# Patient Record
Sex: FEMALE | NOT HISPANIC OR LATINO | URBAN - METROPOLITAN AREA
[De-identification: names, ages, dates, MRNs, and addresses within clinical notes are randomized per-mention and may not be internally consistent; named-entity substitution may affect disease eponyms.]

---

## 2020-02-25 ENCOUNTER — OFFICE VISIT (OUTPATIENT)
Dept: FAMILY MEDICINE CLINIC | Facility: CLINIC | Age: 20
End: 2020-02-25
Payer: COMMERCIAL

## 2020-02-25 VITALS
WEIGHT: 121 LBS | OXYGEN SATURATION: 98 % | BODY MASS INDEX: 22.26 KG/M2 | RESPIRATION RATE: 17 BRPM | HEART RATE: 116 BPM | TEMPERATURE: 99.9 F | HEIGHT: 62 IN

## 2020-02-25 DIAGNOSIS — Z76.89 ENCOUNTER TO ESTABLISH CARE: Primary | ICD-10-CM

## 2020-02-25 DIAGNOSIS — J11.1 INFLUENZA: ICD-10-CM

## 2020-02-25 DIAGNOSIS — F41.9 ANXIETY: ICD-10-CM

## 2020-02-25 DIAGNOSIS — F32.81 PREMENSTRUAL DYSPHORIC DISORDER: ICD-10-CM

## 2020-02-25 DIAGNOSIS — R68.89 FLU-LIKE SYMPTOMS: ICD-10-CM

## 2020-02-25 DIAGNOSIS — G43.829 MENSTRUAL MIGRAINE WITHOUT STATUS MIGRAINOSUS, NOT INTRACTABLE: ICD-10-CM

## 2020-02-25 DIAGNOSIS — E03.9 ACQUIRED HYPOTHYROIDISM: ICD-10-CM

## 2020-02-25 LAB — S PYO AG THROAT QL: NEGATIVE

## 2020-02-25 PROCEDURE — 3008F BODY MASS INDEX DOCD: CPT | Performed by: INTERNAL MEDICINE

## 2020-02-25 PROCEDURE — 87880 STREP A ASSAY W/OPTIC: CPT | Performed by: INTERNAL MEDICINE

## 2020-02-25 PROCEDURE — 1036F TOBACCO NON-USER: CPT | Performed by: INTERNAL MEDICINE

## 2020-02-25 PROCEDURE — 87631 RESP VIRUS 3-5 TARGETS: CPT | Performed by: INTERNAL MEDICINE

## 2020-02-25 PROCEDURE — 99203 OFFICE O/P NEW LOW 30 MIN: CPT | Performed by: INTERNAL MEDICINE

## 2020-02-25 RX ORDER — OSELTAMIVIR PHOSPHATE 75 MG/1
75 CAPSULE ORAL EVERY 12 HOURS SCHEDULED
Qty: 14 CAPSULE | Refills: 0 | Status: SHIPPED | OUTPATIENT
Start: 2020-02-25 | End: 2020-03-03

## 2020-02-25 RX ORDER — THYROID,PORK 15 MG
TABLET ORAL
COMMUNITY
Start: 2020-01-29

## 2020-02-25 RX ORDER — NORTRIPTYLINE HYDROCHLORIDE 25 MG/1
25 CAPSULE ORAL DAILY
COMMUNITY
Start: 2020-01-21

## 2020-02-25 RX ORDER — ESCITALOPRAM OXALATE 20 MG/1
20 TABLET ORAL DAILY
COMMUNITY

## 2020-02-25 NOTE — PROGRESS NOTES
Assessment/Plan: This is most likely influenza A or B  Patient will take Tamiflu 75 b i d  For 7 days  She will continue with Tylenol 650 mg or ibuprofen 400 mg  every 6 hours as needed  In addition throat lozenges and cough medicine with dextromethorphan as needed  She will call tomorrow to check on the results of her influenza PCR to see if she needs to continue the Tamiflu  She was given a prescription for her mother for prophylaxis with Tamiflu  Patient is mildly hypothyroid and should she require monitoring for this she will return to our office after having a level done for her TSH and free T4 and free T3 since she is on [place] Thyroid  Otherwise she will see her gynecologist concerning her Lexapro and nortriptyline  Diet reviewed  Lifestyle modifications reviewed  Medications reviewed and ordered  Laboratory tests and studies reviewed and ordered  All patient's questions answered to patient satisfaction  Diagnoses and all orders for this visit:    Encounter to establish care  -     TSH, 3rd generation; Future  -     T4, free; Future  -     T3, free; Future  -     Anti-microsomal antibody; Future  -     CBC and differential; Future  -     Comprehensive metabolic panel; Future    Flu-like symptoms  -     POCT rapid strepA  -     Influenza A/B and RSV PCR; Future  -     Influenza A/B and RSV PCR    Influenza  -     oseltamivir (TAMIFLU) 75 mg capsule; Take 1 capsule (75 mg total) by mouth every 12 (twelve) hours for 7 days    Premenstrual dysphoric disorder  -     TSH, 3rd generation; Future  -     T4, free; Future  -     T3, free; Future  -     Anti-microsomal antibody; Future  -     CBC and differential; Future  -     Comprehensive metabolic panel; Future    Anxiety    Menstrual migraine without status migrainosus, not intractable    Acquired hypothyroidism  -     TSH, 3rd generation; Future  -     T4, free; Future  -     T3, free; Future  -     Anti-microsomal antibody;  Future  -     CBC and differential; Future  -     Comprehensive metabolic panel; Future    Other orders  -     nortriptyline (PAMELOR) 25 mg capsule; Take 25 mg by mouth daily  -     ARMOUR THYROID 15 MG tablet; TAKE 1 TAB IN AM, 1 TAB AT NOON TAKE ONE HOUR BEFORE MEALS WITH WATER  -     escitalopram (Lexapro) 20 mg tablet; Take 20 mg by mouth daily        Chief Complaint   Patient presents with    Flu-like symptoms     Chills, body aches         Subjective: This is the 1st visit for this 19-year-old female presents with a slightly less than 48 hour history of is unusually sore throat followed by nasal drainage nasal congestion and mild cough which is clear  She has had muscle aches and started Tylenol early in the illness and is noted to have a temperature of a 100° today  Her mother is with her and would like to have prophylaxis for the influenza, she is a [facility] patient also  Patient has a history of what sounds like premenstrual dysphoric disorder, anxiety, and hypothyroidism which her gynecologist has been treating in the past while she lived in [place]  She would like us to treat her thyroid disorder and she can see her gynecologist for her other issues  Patient ID: Yann Tello is a 20 y o  female          Current Outpatient Medications:     ARMOUR THYROID 15 MG tablet, TAKE 1 TAB IN AM, 1 TAB AT NOON TAKE ONE HOUR BEFORE MEALS WITH WATER, Disp: , Rfl:     escitalopram (Lexapro) 20 mg tablet, Take 20 mg by mouth daily, Disp: , Rfl:     nortriptyline (PAMELOR) 25 mg capsule, Take 25 mg by mouth daily, Disp: , Rfl:     oseltamivir (TAMIFLU) 75 mg capsule, Take 1 capsule (75 mg total) by mouth every 12 (twelve) hours for 7 days, Disp: 14 capsule, Rfl: 0    The following portions of the patient's history were reviewed and updated as appropriate: allergies, current medications, past family history, past medical history, past social history, past surgical history and problem list     Review of Systems Constitutional: Negative for appetite change, fatigue, fever and unexpected weight change  HENT: Negative for rhinorrhea, sinus pressure, sinus pain, sneezing and sore throat  Eyes: Negative for visual disturbance  Respiratory: Negative for cough, chest tightness, shortness of breath and wheezing  Cardiovascular: Negative for chest pain, palpitations and leg swelling  Gastrointestinal: Negative for abdominal distention, abdominal pain, blood in stool, constipation, diarrhea, nausea and vomiting  Endocrine: Negative for polydipsia and polyuria  Genitourinary: Negative for decreased urine volume, difficulty urinating, dysuria, hematuria and urgency  Musculoskeletal: Negative for arthralgias, back pain, joint swelling and neck pain  Skin: Negative for rash  Allergic/Immunologic: Negative for environmental allergies  Neurological: Negative for tremors, weakness, light-headedness, numbness and headaches  Hematological: Does not bruise/bleed easily  Psychiatric/Behavioral: Negative for agitation, behavioral problems, confusion and dysphoric mood  The patient is not nervous/anxious  Family History   Family history unknown: Yes       Past Medical History:   Diagnosis Date    Allergic     Depression     Urinary tract infection        History reviewed  No pertinent surgical history      Social History     Socioeconomic History    Marital status: Single     Spouse name: None    Number of children: None    Years of education: None    Highest education level: None   Occupational History    None   Social Needs    Financial resource strain: Not hard at all   [employer] insecurity:     Worry: Never true     Inability: Never true   [employer] needs:     Medical: No     Non-medical: No   Tobacco Use    Smoking status: Never Smoker    Smokeless tobacco: Never Used   Substance and Sexual Activity    Alcohol use: Never     Frequency: Never     Binge frequency: Never    Drug use: Never  Sexual activity: None   Lifestyle    Physical activity:     Days per week: Patient refused     Minutes per session: Patient refused    Stress: Rather much   Relationships    Social connections:     Talks on phone: Three times a week     Gets together: Three times a week     Attends [employer] service: More than 4 times per year     Active member of club or organization: No     Attends meetings of clubs or organizations: Never     Relationship status: Patient refused    Intimate partner violence:     Fear of current or ex partner: No     Emotionally abused: No     Physically abused: No     Forced sexual activity: No   Other Topics Concern    None   Social History Narrative    None       No Known Allergies      Objective:    Pulse (!) 116   Temp 99 9 °F (37 7 °C)   Resp 17   Ht 5' 2" (1 575 m)   Wt 54 9 kg (121 lb)   SpO2 98%   BMI 22 13 kg/m²        Physical Exam   Constitutional: She is oriented to person, place, and time  She appears well-developed and well-nourished  No distress  HENT:   Head: Normocephalic and atraumatic  Nose: Nose normal    Mouth/Throat: Oropharynx is clear and moist  No oropharyngeal exudate  Throat is red and inflamed with no exudates    Nose is red and congested with clear drainage   Eyes: Pupils are equal, round, and reactive to light  Conjunctivae and EOM are normal  No scleral icterus  Neck: Normal range of motion  Neck supple  No JVD present  No tracheal deviation present  No thyromegaly present  Cardiovascular: Normal rate, regular rhythm and normal heart sounds  Exam reveals no gallop and no friction rub  No murmur heard  Pulmonary/Chest: Effort normal  No respiratory distress  She has no wheezes  She has no rales  She exhibits no tenderness  Abdominal: Soft  Bowel sounds are normal  She exhibits no distension and no mass  There is no tenderness  There is no rebound and no guarding  Musculoskeletal: Normal range of motion   She exhibits no edema or deformity  Lymphadenopathy:     She has no cervical adenopathy  Neurological: She is alert and oriented to person, place, and time  No cranial nerve deficit  Coordination normal    Skin: Skin is warm and dry  No rash noted  Psychiatric: She has a normal mood and affect   Her behavior is normal  Judgment and thought content normal

## 2020-02-26 LAB
FLUAV RNA NPH QL NAA+PROBE: NORMAL
FLUBV RNA NPH QL NAA+PROBE: NORMAL
RSV RNA NPH QL NAA+PROBE: NORMAL

## 2020-03-04 ENCOUNTER — OFFICE VISIT (OUTPATIENT)
Dept: FAMILY MEDICINE CLINIC | Facility: CLINIC | Age: 20
End: 2020-03-04
Payer: COMMERCIAL

## 2020-03-04 ENCOUNTER — APPOINTMENT (OUTPATIENT)
Dept: LAB | Facility: HOSPITAL | Age: 20
End: 2020-03-04
Payer: COMMERCIAL

## 2020-03-04 VITALS
TEMPERATURE: 98.9 F | SYSTOLIC BLOOD PRESSURE: 110 MMHG | OXYGEN SATURATION: 98 % | HEIGHT: 62 IN | WEIGHT: 121 LBS | DIASTOLIC BLOOD PRESSURE: 62 MMHG | BODY MASS INDEX: 22.26 KG/M2 | HEART RATE: 142 BPM

## 2020-03-04 DIAGNOSIS — E03.9 ACQUIRED HYPOTHYROIDISM: ICD-10-CM

## 2020-03-04 DIAGNOSIS — R68.89 FLU-LIKE SYMPTOMS: Primary | ICD-10-CM

## 2020-03-04 DIAGNOSIS — J01.00 ACUTE MAXILLARY SINUSITIS, RECURRENCE NOT SPECIFIED: ICD-10-CM

## 2020-03-04 DIAGNOSIS — R68.89 FLU-LIKE SYMPTOMS: ICD-10-CM

## 2020-03-04 LAB
ALBUMIN SERPL BCP-MCNC: 4 G/DL (ref 3.5–5)
ALP SERPL-CCNC: 85 U/L (ref 46–116)
ALT SERPL W P-5'-P-CCNC: 32 U/L (ref 12–78)
ANION GAP SERPL CALCULATED.3IONS-SCNC: 9 MMOL/L (ref 4–13)
AST SERPL W P-5'-P-CCNC: 27 U/L (ref 5–45)
BACTERIA UR QL AUTO: ABNORMAL /HPF
BASOPHILS # BLD AUTO: 0.05 THOUSANDS/ΜL (ref 0–0.1)
BASOPHILS NFR BLD AUTO: 0 % (ref 0–1)
BILIRUB SERPL-MCNC: 0.18 MG/DL (ref 0.2–1)
BILIRUB UR QL STRIP: NEGATIVE
BUN SERPL-MCNC: 16 MG/DL (ref 5–25)
CALCIUM SERPL-MCNC: 9 MG/DL (ref 8.3–10.1)
CHLORIDE SERPL-SCNC: 101 MMOL/L (ref 100–108)
CLARITY UR: CLEAR
CO2 SERPL-SCNC: 27 MMOL/L (ref 21–32)
COLOR UR: YELLOW
CREAT SERPL-MCNC: 0.78 MG/DL (ref 0.6–1.3)
EOSINOPHIL # BLD AUTO: 0.32 THOUSAND/ΜL (ref 0–0.61)
EOSINOPHIL NFR BLD AUTO: 3 % (ref 0–6)
ERYTHROCYTE [DISTWIDTH] IN BLOOD BY AUTOMATED COUNT: 12.3 % (ref 11.6–15.1)
GFR SERPL CREATININE-BSD FRML MDRD: 110 ML/MIN/1.73SQ M
GLUCOSE SERPL-MCNC: 72 MG/DL (ref 65–140)
GLUCOSE UR STRIP-MCNC: NEGATIVE MG/DL
HCT VFR BLD AUTO: 39.7 % (ref 34.8–46.1)
HGB BLD-MCNC: 12.9 G/DL (ref 11.5–15.4)
HGB UR QL STRIP.AUTO: ABNORMAL
IMM GRANULOCYTES # BLD AUTO: 0.23 THOUSAND/UL (ref 0–0.2)
IMM GRANULOCYTES NFR BLD AUTO: 2 % (ref 0–2)
KETONES UR STRIP-MCNC: NEGATIVE MG/DL
LEUKOCYTE ESTERASE UR QL STRIP: NEGATIVE
LYMPHOCYTES # BLD AUTO: 3.56 THOUSANDS/ΜL (ref 0.6–4.47)
LYMPHOCYTES NFR BLD AUTO: 31 % (ref 14–44)
MCH RBC QN AUTO: 29.3 PG (ref 26.8–34.3)
MCHC RBC AUTO-ENTMCNC: 32.5 G/DL (ref 31.4–37.4)
MCV RBC AUTO: 90 FL (ref 82–98)
MONOCYTES # BLD AUTO: 1.24 THOUSAND/ΜL (ref 0.17–1.22)
MONOCYTES NFR BLD AUTO: 11 % (ref 4–12)
MUCOUS THREADS UR QL AUTO: ABNORMAL
NEUTROPHILS # BLD AUTO: 6.23 THOUSANDS/ΜL (ref 1.85–7.62)
NEUTS SEG NFR BLD AUTO: 53 % (ref 43–75)
NITRITE UR QL STRIP: NEGATIVE
NON-SQ EPI CELLS URNS QL MICRO: ABNORMAL /HPF
NRBC BLD AUTO-RTO: 0 /100 WBCS
PH UR STRIP.AUTO: 5.5 [PH]
PLATELET # BLD AUTO: 367 THOUSANDS/UL (ref 149–390)
PMV BLD AUTO: 9.5 FL (ref 8.9–12.7)
POTASSIUM SERPL-SCNC: 3.9 MMOL/L (ref 3.5–5.3)
PROT SERPL-MCNC: 8.1 G/DL (ref 6.4–8.2)
PROT UR STRIP-MCNC: NEGATIVE MG/DL
RBC # BLD AUTO: 4.4 MILLION/UL (ref 3.81–5.12)
RBC #/AREA URNS AUTO: ABNORMAL /HPF
SODIUM SERPL-SCNC: 137 MMOL/L (ref 136–145)
SP GR UR STRIP.AUTO: >=1.03 (ref 1–1.03)
T3FREE SERPL-MCNC: 2.63 PG/ML (ref 2.91–4.53)
T4 FREE SERPL-MCNC: 0.87 NG/DL (ref 0.78–1.33)
TSH SERPL DL<=0.05 MIU/L-ACNC: 1.59 UIU/ML (ref 0.46–3.98)
UROBILINOGEN UR QL STRIP.AUTO: 0.2 E.U./DL
WBC # BLD AUTO: 11.63 THOUSAND/UL (ref 4.31–10.16)
WBC #/AREA URNS AUTO: ABNORMAL /HPF

## 2020-03-04 PROCEDURE — 1036F TOBACCO NON-USER: CPT | Performed by: SPECIALIST

## 2020-03-04 PROCEDURE — 99213 OFFICE O/P EST LOW 20 MIN: CPT | Performed by: SPECIALIST

## 2020-03-04 PROCEDURE — 84481 FREE ASSAY (FT-3): CPT

## 2020-03-04 PROCEDURE — 84439 ASSAY OF FREE THYROXINE: CPT

## 2020-03-04 PROCEDURE — 36415 COLL VENOUS BLD VENIPUNCTURE: CPT

## 2020-03-04 PROCEDURE — 80053 COMPREHEN METABOLIC PANEL: CPT

## 2020-03-04 PROCEDURE — 3008F BODY MASS INDEX DOCD: CPT | Performed by: SPECIALIST

## 2020-03-04 PROCEDURE — 86308 HETEROPHILE ANTIBODY SCREEN: CPT

## 2020-03-04 PROCEDURE — 84443 ASSAY THYROID STIM HORMONE: CPT

## 2020-03-04 PROCEDURE — 81001 URINALYSIS AUTO W/SCOPE: CPT | Performed by: SPECIALIST

## 2020-03-04 PROCEDURE — 85025 COMPLETE CBC W/AUTO DIFF WBC: CPT

## 2020-03-04 RX ORDER — AMOXICILLIN 500 MG/1
500 CAPSULE ORAL EVERY 8 HOURS SCHEDULED
Qty: 30 CAPSULE | Refills: 0 | Status: SHIPPED | OUTPATIENT
Start: 2020-03-04 | End: 2020-03-14

## 2020-03-04 NOTE — PROGRESS NOTES
Assessment/Plan:    Patient was seen today follow-up she was recently seen by Dr Yulissa Gallardo who tested her for influenza Tamiflu was considered but stopped because the flu test was negative she still complains of cough weakness fatigue pressure over her sinuses and fullness in the ears    Secondary to what appears to be acute viral syndrome test were ordered that same is Dr Pablo Mccollum ordered on but I added mono  screen as well      Patient just moved here from [place] she is on armor extract so we ordered a free T3 free T4 and TSH await results the mono screen is positive I would not give amoxicillin they should do that as soon as possible the blood test    Patient seen in office today  During the visit I was accompanied by MA while physical exam was completed  No problem-specific Assessment & Plan notes found for this encounter  Problem List Items Addressed This Visit        Endocrine    Acquired hypothyroidism    Relevant Medications    amoxicillin (AMOXIL) 500 mg capsule    Other Relevant Orders    CBC and differential    Comprehensive metabolic panel    Mononucleosis screen    UA w Reflex to Microscopic w Reflex to Culture -Lab Collect    T4, free    TSH, 3rd generation    T3, free      Other Visit Diagnoses     Flu-like symptoms    -  Primary    Relevant Medications    amoxicillin (AMOXIL) 500 mg capsule    Other Relevant Orders    CBC and differential    Comprehensive metabolic panel    Mononucleosis screen    UA w Reflex to Microscopic w Reflex to Culture -Lab Collect    Acute maxillary sinusitis, recurrence not specified        Relevant Medications    amoxicillin (AMOXIL) 500 mg capsule            Subjective:            Patient ID: Azam Pastrana is a 20 y o  female      56-year-old female fatigue weakness episodes of fever cough sinus pressure started out what appear to be an upper respiratory tract infection she is not on any specific antibiotics the present time test for flu was negative I am recommending is same blood test Dr Toby Ureña ordered since she is on [place] ex tract I also recommend a mono screen      The following portions of the patient's history were reviewed and updated as appropriate: allergies, past family history, past social history and past surgical history  Review of Systems   Constitutional: Positive for activity change, appetite change and fatigue  Negative for chills, diaphoresis and fever  HENT: Positive for congestion, sinus pressure and sinus pain  Negative for voice change  Eyes: Negative for visual disturbance  Respiratory: Negative for cough, chest tightness, shortness of breath and wheezing  Cardiovascular: Negative for chest pain, palpitations and leg swelling  Gastrointestinal: Negative for abdominal pain  Genitourinary: Negative for difficulty urinating and dysuria  Musculoskeletal: Negative for arthralgias, back pain, gait problem, joint swelling, myalgias, neck pain and neck stiffness  Skin: Negative  Neurological: Negative for dizziness, tremors, seizures, syncope, facial asymmetry, speech difficulty, weakness, light-headedness, numbness and headaches  Psychiatric/Behavioral: Negative for agitation  Objective: There were no vitals taken for this visit  Physical Exam   Constitutional: She is oriented to person, place, and time  No distress  HENT:   She has redness of the posterior pharynx but no exudate   no discrete nodes in the neck and TMs are normal   Eyes: No scleral icterus  Neck: No JVD present  Cardiovascular: Normal rate, regular rhythm, normal heart sounds and intact distal pulses  No murmur heard  Pulmonary/Chest: No respiratory distress  She has no wheezes  She has no rales  Abdominal: Soft  Musculoskeletal: She exhibits no edema  Neurological: She is alert and oriented to person, place, and time  Skin: Skin is warm and dry  She is not diaphoretic  Psychiatric: She has a normal mood and affect

## 2020-03-04 NOTE — PATIENT INSTRUCTIONS
Do your blood studies today that includes a mononucleosis screen    Rest at home      If mono screen is negative he could take amoxicillin

## 2020-03-05 ENCOUNTER — TELEPHONE (OUTPATIENT)
Dept: FAMILY MEDICINE CLINIC | Facility: CLINIC | Age: 20
End: 2020-03-05

## 2020-03-05 LAB — HETEROPH AB SER QL: NEGATIVE

## 2022-11-17 ENCOUNTER — OFFICE VISIT (OUTPATIENT)
Dept: URGENT CARE | Facility: MEDICAL CENTER | Age: 22
End: 2022-11-17

## 2022-11-17 VITALS
SYSTOLIC BLOOD PRESSURE: 120 MMHG | RESPIRATION RATE: 18 BRPM | HEART RATE: 96 BPM | OXYGEN SATURATION: 100 % | TEMPERATURE: 99.9 F | DIASTOLIC BLOOD PRESSURE: 90 MMHG

## 2022-11-17 DIAGNOSIS — J01.00 ACUTE MAXILLARY SINUSITIS, RECURRENCE NOT SPECIFIED: ICD-10-CM

## 2022-11-17 DIAGNOSIS — J02.9 SORE THROAT: Primary | ICD-10-CM

## 2022-11-17 LAB
S PYO AG THROAT QL: NEGATIVE
SARS-COV-2 AG UPPER RESP QL IA: NEGATIVE
VALID CONTROL: NORMAL

## 2022-11-17 RX ORDER — LEVONORGESTREL AND ETHINYL ESTRADIOL 0.15-0.03
1 KIT ORAL DAILY
COMMUNITY
Start: 2022-11-01

## 2022-11-17 RX ORDER — AMOXICILLIN AND CLAVULANATE POTASSIUM 875; 125 MG/1; MG/1
1 TABLET, FILM COATED ORAL EVERY 12 HOURS SCHEDULED
Qty: 20 TABLET | Refills: 0 | Status: SHIPPED | OUTPATIENT
Start: 2022-11-17 | End: 2022-11-27

## 2022-11-17 RX ORDER — DIPHENHYDRAMINE HCL 25 MG
25 CAPSULE ORAL
COMMUNITY

## 2022-11-17 RX ORDER — DOXYCYCLINE HYCLATE 100 MG/1
CAPSULE ORAL
COMMUNITY
Start: 2022-11-15

## 2022-11-17 RX ORDER — RIZATRIPTAN BENZOATE 5 MG/1
5 TABLET, ORALLY DISINTEGRATING ORAL
COMMUNITY
Start: 2022-07-11

## 2022-11-18 NOTE — PATIENT INSTRUCTIONS
Patient placed on continuous , automatic blood pressure cuff and continuous pulse oximeter.   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 also for nasal congestion and postnasal drip  She may continue Sudafed if needed for sinus pain pressure  Rhinosinusitis   WHAT YOU NEED TO KNOW:   Rhinosinusitis (RS) is inflammation or infection of your nasal passages and sinuses  RS is most often caused by a virus but may be caused by bacteria  Acute RS lasts up to 12 weeks  Chronic RS lasts more than 12 weeks  Recurrent RS means you have 4 or more infections in 1 year  DISCHARGE INSTRUCTIONS:   Return to the emergency department if:   You have trouble breathing, or wheezing that is getting worse  You have a stiff neck, a fever, or a bad headache  You cannot open your eye  Your eyeball bulges out, or you cannot move your eye  You are more sleepy than usual, or you notice changes in your ability to think, move, or talk  You have swelling of your forehead or scalp  Call your doctor if:   You have vision changes, such as double vision  Your eye and eyelid are red, swollen, and painful  Your symptoms do not improve after 10 days  You have nausea and are vomiting  Your nose is bleeding  You have questions or concerns about your condition or care  Medicines: Your symptoms may go away on their own  Your healthcare provider may recommend watchful waiting for up to 10 days before starting antibiotics  Antibiotics will not help if the infection is caused by a virus  Check with your provider before you take any over-the-counter medicines  You may need any of the following:  Acetaminophen  decreases pain and fever  It is available without a doctor's order  Ask how much to take and how often to take it  Follow directions   Read the labels of all other medicines you are using to see if they also contain acetaminophen, or ask your doctor or pharmacist  Acetaminophen can cause liver damage if not taken correctly  Do not use more than 4 grams (4,000 milligrams) total of acetaminophen in one day  NSAIDs , such as ibuprofen, help decrease swelling, pain, and fever  This medicine is available with or without a doctor's order  NSAIDs can cause stomach bleeding or kidney problems in certain people  If you take blood thinner medicine, always ask your healthcare provider if NSAIDs are safe for you  Always read the medicine label and follow directions  Nasal steroid sprays  help decrease inflammation in your nose and sinuses  Decongestants  help reduce swelling and drain mucus in the nose and sinuses  They may help you breathe easier  Do not use decongestants for more than 3 days  Antihistamines  help dry mucus in the nose and relieve sneezing  Antibiotics  help treat or prevent a bacterial infection  Self-care:   Rinse your sinuses as directed  Use a sinus rinse device to rinse your nasal passages with a saline (salt water) solution or distilled water  Do not  use tap water  A sinus rinse will help thin the mucus in your nose and rinse away pollen and dirt  It will also help lower swelling so you can breathe normally  Use a humidifier  to increase air moisture in your home  Moist air may make it easier for you to breathe and help decrease your cough  Sleep with your head raised  Place an extra pillow under your head before you go to sleep to help your sinuses drain  Drink liquids as directed  Ask your healthcare provider how much liquid to drink each day and which liquids are best for you  Liquids will thin the mucus in your nose and help it drain  Do not have drinks that contain alcohol or caffeine  Do not smoke, and avoid secondhand smoke  Nicotine and other chemicals in cigarettes and cigars can make your symptoms worse   Ask your healthcare provider for information if you currently smoke and need help to quit  E-cigarettes or smokeless tobacco still contain nicotine  Talk to your healthcare provider before you use these products  Prevent the spread of germs:   Wash your hands often with soap and water  Wash your hands after you use the bathroom, change a child's diaper, or sneeze  Wash your hands before you prepare or eat food  Stay away from people who are sick  Some germs spread easily and quickly through contact  Follow up with your doctor as directed: You may be referred to an ear, nose, and throat specialist  Write down your questions so you remember to ask them during your visits  © Copyright [employer] 2022 Information is for End User's use only and may not be sold, redistributed or otherwise used for commercial purposes  All illustrations and images included in CareNotes® are the copyrighted property of [employer]  or Selvin Parry  The above information is an  only  It is not intended as medical advice for individual conditions or treatments  Talk to your doctor, nurse or pharmacist before following any medical regimen to see if it is safe and effective for you

## 2022-11-18 NOTE — PROGRESS NOTES
[facility]        NAME: Gita Turner is a 20 y o  female  : 2000    MRN: [id number]  DATE: 2022  TIME: 10:33 PM    Assessment and Plan   Sore throat [J02 9]  1  Sore throat  POCT rapid strepA    Poct Covid 19 Rapid Antigen Test    Throat culture      2  Acute maxillary sinusitis, recurrence not specified  amoxicillin-clavulanate (AUGMENTIN) 875-125 mg per tablet            Patient Instructions       Follow up with PCP in 3-5 days  Proceed to  ER if symptoms worsen  Chief Complaint     Chief Complaint   Patient presents with   • Cold Like Symptoms     Patient c/o sore throat, nasal congestion, ear fullness, and dizziness x 4 days           History of Present Illness       20year-old female complaining of sore throat, nasal congestion ear fullness and dizziness for the last 4 days  However she is noncompliant with severe sinus pain and pressure greenish nasal discharge  Denies fever  She is taken over-the-counter medications such as Sudafed and Mucinex which has not helped  Denies any shortness of breath  Refer to nonproductive cough  Patient unaware of any recent sick contact  Review of Systems   Review of Systems   Constitutional: Negative  HENT: Positive for congestion, sinus pressure, sinus pain and sore throat  Respiratory: Positive for cough            Current Medications       Current Outpatient Medications:   •  amoxicillin-clavulanate (AUGMENTIN) 875-125 mg per tablet, Take 1 tablet by mouth every 12 (twelve) hours for 10 days, Disp: 20 tablet, Rfl: 0  •  rizatriptan (MAXALT-MLT) 5 mg disintegrating tablet, Take 5 mg by mouth, Disp: , Rfl:   •  tamsulosin (FLOMAX) 0 4 mg, TAKE 1 CAPSULE BY MOUTH EVERY DAY AT NIGHT, Disp: , Rfl:   •  ARMOUR THYROID 15 MG tablet, TAKE 1 TAB IN AM, 1 TAB AT NOON TAKE ONE HOUR BEFORE MEALS WITH WATER, Disp: , Rfl:   •  diphenhydrAMINE (BENADRYL) 25 mg capsule, Take 25 mg by mouth, Disp: , Rfl:   •  doxycycline hyclate (VIBRAMYCIN) 100 mg capsule, TAKE 1 CAPSULE BY MOUTH ONCE A DAY WITH A MEAL, Disp: , Rfl:   •  escitalopram (Lexapro) 20 mg tablet, Take 20 mg by mouth daily, Disp: , Rfl:   •  levonorgestrel-ethinyl estradiol (SEASONALE) 0 15-0 03 MG per tablet, Take 1 tablet by mouth daily, Disp: , Rfl:   •  nortriptyline (PAMELOR) 25 mg capsule, Take 25 mg by mouth daily, Disp: , Rfl:   •  progesterone (PROMETRIUM) 100 MG capsule, TAKE 1 CAPSULE BY MOUTH AS NEEDED FOR PMS 2 WEEKS BEFORE PERIODS, Disp: , Rfl:     Current Allergies     Allergies as of 11/17/2022 - Reviewed 11/17/2022   Allergen Reaction Noted   • Chocolate - food allergy Allergic Rhinitis 01/26/2021   • Other Dermatitis 01/26/2021   • Pollen extract Allergic Rhinitis 01/26/2021   • Strawberry extract - food allergy Hives 02/04/2021            The following portions of the patient's history were reviewed and updated as appropriate: allergies, current medications, past family history, past medical history, past social history, past surgical history and problem list      Past Medical History:   Diagnosis Date   • Allergic    • Depression    • Urinary tract infection        No past surgical history on file  Family History   Family history unknown: Yes         Medications have been verified  Objective   /90   Pulse 96   Temp 99 9 °F (37 7 °C)   Resp 18   SpO2 100%   No LMP recorded  Physical Exam     Physical Exam  Vitals and nursing note reviewed  Constitutional:       Appearance: Normal appearance  HENT:      Head:      Comments: Maxillary sinus tenderness  Nose:      Comments: Erythematous hypertrophic turbinates bilaterally  Mouth/Throat:      Mouth: Mucous membranes are moist    Pulmonary:      Effort: Pulmonary effort is normal       Breath sounds: Normal breath sounds  Neurological:      Mental Status: She is alert

## 2022-11-19 LAB — BACTERIA THROAT CULT: NORMAL

## 2023-11-21 ENCOUNTER — OFFICE VISIT (OUTPATIENT)
Dept: OBGYN CLINIC | Facility: CLINIC | Age: 23
End: 2023-11-21
Payer: COMMERCIAL

## 2023-11-21 VITALS
SYSTOLIC BLOOD PRESSURE: 114 MMHG | DIASTOLIC BLOOD PRESSURE: 78 MMHG | BODY MASS INDEX: 26.76 KG/M2 | WEIGHT: 145.4 LBS | HEIGHT: 62 IN

## 2023-11-21 DIAGNOSIS — E58 DIETARY CALCIUM DEFICIENCY: ICD-10-CM

## 2023-11-21 DIAGNOSIS — N93.0 POSTCOITAL BLEEDING: ICD-10-CM

## 2023-11-21 DIAGNOSIS — Z71.85 HPV VACCINE COUNSELING: ICD-10-CM

## 2023-11-21 DIAGNOSIS — Z12.4 PAP SMEAR FOR CERVICAL CANCER SCREENING: ICD-10-CM

## 2023-11-21 DIAGNOSIS — Z01.419 ENCOUNTER FOR ANNUAL ROUTINE GYNECOLOGICAL EXAMINATION: Primary | ICD-10-CM

## 2023-11-21 DIAGNOSIS — Z72.3 INADEQUATE EXERCISE: ICD-10-CM

## 2023-11-21 PROBLEM — N20.0 NEPHROLITHIASIS: Status: ACTIVE | Noted: 2021-02-04

## 2023-11-21 PROBLEM — N20.0 NEPHROLITHIASIS: Status: RESOLVED | Noted: 2021-02-04 | Resolved: 2023-11-21

## 2023-11-21 PROBLEM — F32.A DEPRESSION: Status: ACTIVE | Noted: 2020-06-26

## 2023-11-21 PROBLEM — N20.1 URETER, CALCULUS: Status: ACTIVE | Noted: 2022-05-16

## 2023-11-21 PROBLEM — N20.1 URETER, CALCULUS: Status: RESOLVED | Noted: 2022-05-16 | Resolved: 2023-11-21

## 2023-11-21 PROBLEM — J30.9 ALLERGIC RHINITIS: Status: ACTIVE | Noted: 2022-06-13

## 2023-11-21 PROBLEM — E55.9 VITAMIN D DEFICIENCY: Status: ACTIVE | Noted: 2022-01-13

## 2023-11-21 PROCEDURE — S0610 ANNUAL GYNECOLOGICAL EXAMINA: HCPCS | Performed by: OBSTETRICS & GYNECOLOGY

## 2023-11-21 PROCEDURE — 87491 CHLMYD TRACH DNA AMP PROBE: CPT | Performed by: OBSTETRICS & GYNECOLOGY

## 2023-11-21 PROCEDURE — 87591 N.GONORRHOEAE DNA AMP PROB: CPT | Performed by: OBSTETRICS & GYNECOLOGY

## 2023-11-21 PROCEDURE — G0145 SCR C/V CYTO,THINLAYER,RESCR: HCPCS | Performed by: OBSTETRICS & GYNECOLOGY

## 2023-11-21 PROCEDURE — 90651 9VHPV VACCINE 2/3 DOSE IM: CPT | Performed by: OBSTETRICS & GYNECOLOGY

## 2023-11-21 PROCEDURE — 90471 IMMUNIZATION ADMIN: CPT | Performed by: OBSTETRICS & GYNECOLOGY

## 2023-11-21 RX ORDER — CLINDAMYCIN PHOSPHATE 10 UG/ML
LOTION TOPICAL
COMMUNITY
Start: 2023-10-26

## 2023-11-21 RX ORDER — LEVOTHYROXINE SODIUM 0.03 MG/1
25 TABLET ORAL DAILY
COMMUNITY
Start: 2023-09-11

## 2023-11-21 NOTE — PROGRESS NOTES
NEW PATIENT    Pt is a 24 y.o. Selin Gould with Patient's last menstrual period was 2023 (approximate). using abstinence for [facility] presents for preventive care. She notes the same partner since her last STI evaluation. In her lifetime she has been involved with 1 partner . Safe sexual practices (monogomy) are followed consistently. She does feel safe in the relationship. She does feel safe in her home. Her calcium intake encompasses cheese for a total of  0-1  servings daily on average. She does take additional Vitamin D (MVI or supplement). She exercises 2-3 times per week. Her menses occur every 28 Days, last  5-7  days and require regular pad every 2-3 hours. Menstrual History:  OB History          0    Para   0    Term   0       0    AB   0    Living   0         SAB   0    IAB   0    Ectopic   0    Multiple   0    Live Births   0           Obstetric Comments   Menarche: 11    Menses: 28/5-7/regular pad every 2-3 hours               Menarche age: 14  Patient's last menstrual period was 2023 (approximate). Period Duration (Days): 5-7  Period Pattern: (!) Irregular  Menstrual Flow: Moderate  Menstrual Control: Maxi pad, Panty liner       She has never recieved an HPV vaccine and does desire to begin or continue the HPV vaccination series. tobacco use : does not use tobacco              Colonoscopy: not indicated  Mammogram: not indicated  Pap: never had; collected today  Ch/citlaly screening: agreeable  Notes post coital bleeding when she is active    Past Medical History:   Diagnosis Date    Allergic     Canker sores oral     Depression     HPV vaccine counseling     desires to proceed    Kidney stone     Migraines     Nephrolithiasis 2021    Last Assessment & Plan: Formatting of this note might be different from the original. We will perform a full metabolic work-up to determine lithogenic risk factors. This includes serum studies and 2 consecutive 24 hour urine tests. Thyroid disorder     Ureter, calculus 2022    Urinary tract infection        Past Surgical History:   Procedure Laterality Date    KIDNEY STONE SURGERY      lithotripsy and stent placement and then stent removal    WISDOM TOOTH EXTRACTION         OB History    Para Term  AB Living   0 0 0 0 0 0   SAB IAB Ectopic Multiple Live Births   0 0 0 0 0   Obstetric Comments   Menarche: 11      Menses: 28/5-7/regular pad every 2-3 hours             Current Outpatient Medications:     clindamycin (CLEOCIN T) 1 % lotion, APPLY A THIN LAYER TO ACNE AREAS TWICE A DAY., Disp: , Rfl:     escitalopram (LEXAPRO) 20 mg tablet, Take 20 mg by mouth daily, Disp: , Rfl:     levothyroxine 25 mcg tablet, Take 25 mcg by mouth daily, Disp: , Rfl:     nortriptyline (PAMELOR) 25 mg capsule, Take 25 mg by mouth daily, Disp: , Rfl:     tamsulosin (FLOMAX) 0.4 mg, TAKE 1 CAPSULE BY MOUTH EVERY DAY AT NIGHT, Disp: , Rfl:     Allergies   Allergen Reactions    Chocolate - Food Allergy Allergic Rhinitis     Blisters in mouth    Other Dermatitis     Unknown metal cause reaction causes soreness and puss    Pollen Extract Allergic Rhinitis     stuffy    Strawberry Extract - Food Allergy Hives     Blisters in mouth       Social History     Socioeconomic History    Marital status: Single     Spouse name: None    Number of children: 0    Years of education: None    Highest education level: High school graduate   Occupational History    Occupation: Student at "[employer]   Tobacco Use    Smoking status: Never    Smokeless tobacco: Never   Vaping Use    Vaping Use: Never used   Substance and Sexual Activity    Alcohol use: Not Currently     Alcohol/week: 0.0 - 1.0 standard drinks of alcohol     Comment: events only, <1/month    Drug use: Not Currently     Comment: had marijuana gummies in the past    Sexual activity: Not Currently     Partners: Male     Birth control/protection: Abstinence, Coitus interruptus     Comment: lifetime partners: 1   Other Topics Concern    None   Social History Narrative    Bahai: [employer] blood products        Exercise: 3x/week    Calcium: 1 cheese 2-3x/week     Social Determinants of Health     Financial Resource Strain: Low Risk  (2/25/2020)    Overall Financial Resource Strain (CARDIA)     Difficulty of Paying Living Expenses: Not hard at all   Food Insecurity: No Food Insecurity (2/25/2020)    Hunger Vital Sign     Worried About Running Out of Food in the Last Year: Never true     Ran Out of Food in the Last Year: Never true   Transportation Needs: No Transportation Needs (2/25/2020)    PRAPARE - Transportation     Lack of Transportation (Medical): No     Lack of Transportation (Non-Medical): No   Physical Activity: Unknown (2/25/2020)    Exercise Vital Sign     Days of Exercise per Week: Patient refused     Minutes of Exercise per Session: Patient refused   Stress: Stress Concern Present (2/25/2020)    [facility]     Feeling of Stress : Rather much   Social Connections: Unknown (2/25/2020)    Social Connection and Isolation Panel [NHANES]     Frequency of Communication with Friends and Family: Three times a week     Frequency of Social Gatherings with Friends and Family:  Three times a week     Attends Scientology Services: More than 4 times per year     Active Member of Clubs or Organizations: No     Attends Club or Organization Meetings: Never     Marital Status: Patient refused   Intimate Partner Violence: Not At Risk (2/25/2020)    Humiliation, Afraid, Rape, and Kick questionnaire     Fear of Current or Ex-Partner: No     Emotionally Abused: No     Physically Abused: No     Sexually Abused: No   Housing Stability: Not on file       Family History   Problem Relation Age of Onset    Hypertension Mother     Migraines Father     Migraines Brother     Breast cancer Maternal Grandmother 72    Arthritis Maternal Grandmother Hypertension Maternal Grandfather     Lupus Paternal Grandmother     Leukemia Paternal Grandfather     Ovarian cancer Neg Hx     Colon cancer Neg Hx        Blood pressure 114/78, height 5' 2" (1.575 m), weight 66 kg (145 lb 6.4 oz), last menstrual period 11/01/2023. and Body mass index is 26.59 kg/m². Physical Exam  Constitutional:       General: She is not in acute distress. Appearance: Normal appearance. She is well-developed and normal weight. She is not ill-appearing. HENT:      Head: Normocephalic and atraumatic. Eyes:      Extraocular Movements: Extraocular movements intact. Conjunctiva/sclera: Conjunctivae normal.   Neck:      Thyroid: No thyromegaly. Trachea: No tracheal deviation. Cardiovascular:      Rate and Rhythm: Normal rate and regular rhythm. Heart sounds: Normal heart sounds. Pulmonary:      Effort: Pulmonary effort is normal. No respiratory distress. Breath sounds: Normal breath sounds. No stridor. No wheezing or rales. Abdominal:      General: Abdomen is flat. Bowel sounds are normal. There is no distension. Palpations: Abdomen is soft. There is no mass. Tenderness: There is no abdominal tenderness. There is no guarding or rebound. Hernia: No hernia is present. Musculoskeletal:         General: No tenderness. Normal range of motion. Cervical back: Normal range of motion and neck supple. Lymphadenopathy:      Cervical: No cervical adenopathy. Skin:     General: Skin is warm. Findings: No erythema or rash. Comments: acne   Neurological:      Mental Status: She is alert and oriented to person, place, and time. Psychiatric:         Mood and Affect: Mood normal.         Behavior: Behavior normal.         Thought Content: Thought content normal.         Judgment: Judgment normal.         Breasts: breasts appear normal, no suspicious masses, no skin or nipple changes or axillary nodes.       vulva: normal external genitalia for age and no lesions, masses, epithelial changes, or exudate  vagina: color pink, rugae  well formed rugae, and discharge  yellow  cervix: nullip, no lesions , and pap obtained  uterus: NSSC, AF, NT, mobile  adnexa: no masses or tenderness      A/P:  Pt is a 24 y.o.  with      Morenita was seen today for new patient visit. Diagnoses and all orders for this visit:    Encounter for annual routine gynecological examination  -pap obtained with ch/citlaly secondary to post coital bleeding    Pap smear for cervical cancer screening  -     Liquid-based pap, screening    Postcoital bleeding  -     Chlamydia/GC amplified DNA by PCR    HPV vaccine counseling  -     HPV Vaccine 9 valent IM    Dietary calcium deficiency  Patient advised recommendation of daily dietary calcium of 1000 mg calcium. Inadequate exercise  Patient advised recommendation of exercise 5 times per week for 30 minutes.

## 2023-11-23 LAB
C TRACH DNA SPEC QL NAA+PROBE: NEGATIVE
N GONORRHOEA DNA SPEC QL NAA+PROBE: NEGATIVE

## 2023-11-30 LAB
LAB AP GYN PRIMARY INTERPRETATION: NORMAL
Lab: NORMAL

## 2024-01-20 PROBLEM — Z01.419 ENCOUNTER FOR ANNUAL ROUTINE GYNECOLOGICAL EXAMINATION: Status: RESOLVED | Noted: 2023-11-21 | Resolved: 2024-01-20

## 2025-02-03 ENCOUNTER — ULTRASOUND (OUTPATIENT)
Dept: OBGYN CLINIC | Facility: CLINIC | Age: 25
End: 2025-02-03
Payer: COMMERCIAL

## 2025-02-03 VITALS
WEIGHT: 132 LBS | SYSTOLIC BLOOD PRESSURE: 118 MMHG | HEIGHT: 62 IN | DIASTOLIC BLOOD PRESSURE: 68 MMHG | BODY MASS INDEX: 24.29 KG/M2

## 2025-02-03 DIAGNOSIS — Z34.01 NORMAL FIRST PREGNANCY CONFIRMED, CURRENTLY IN FIRST TRIMESTER: Primary | ICD-10-CM

## 2025-02-03 DIAGNOSIS — N91.2 AMENORRHEA: ICD-10-CM

## 2025-02-03 PROCEDURE — 99214 OFFICE O/P EST MOD 30 MIN: CPT | Performed by: OBSTETRICS & GYNECOLOGY

## 2025-02-03 PROCEDURE — 76817 TRANSVAGINAL US OBSTETRIC: CPT | Performed by: OBSTETRICS & GYNECOLOGY

## 2025-02-03 RX ORDER — ESCITALOPRAM OXALATE 5 MG/1
1 TABLET ORAL DAILY
COMMUNITY
Start: 2024-12-10

## 2025-02-03 NOTE — PROGRESS NOTES
Assessment/Plan:  Diagnoses and all orders for this visit:    Normal first pregnancy confirmed, currently in first trimester  -     Ambulatory Referral to Maternal Fetal Medicine; Future  -     AMB  OB < 14 weeks single or first gestation level 1    Amenorrhea  -     Ambulatory Referral to Maternal Fetal Medicine; Future  -     AMB US OB < 14 weeks single or first gestation level 1    Other orders  -     Prenatal Vit-Fe Fumarate-FA (PRENATAL VITAMIN PO); Take by mouth in the morning  -     escitalopram (LEXAPRO) 5 mg tablet; Take 1 tablet by mouth in the morning        - Viable IUP @ 8w 2d EGA  - DIPAK 25  - Continue PNV  - Patient to call for concerns  - RTO ~ 10-12 weeks for OB intake  - call MFM for 13 week NT scan/genetic testing.           Subjective:       Patient ID: Morenita Lentz [id number]        Morenita Lentz is a 24 y.o.  presenting to the office for pregnancy confirmation. Patient's last menstrual period was 2024 (exact date). , placing her at 8w2d today with DIPAK of 25. She is feeling well overall. This was a planned and welcomed pregnancy. Periods are regular.      Nausea:yes  Vomiting: no  Bleeding: no  Cramping: no  Headaches: no  Fatigue: yes  Constipation: no  Blood type, if known: unknown       OB History    Para Term  AB Living   1 0 0 0 0 0   SAB IAB Ectopic Multiple Live Births   0 0 0 0 0      # Outcome Date GA Lbr Aldo/2nd Weight Sex Type Anes PTL Lv   1 Current               Obstetric Comments   Menarche: 11      Menses: 28/5-7/regular pad every 2-3 hours          The following portions of the patient's history were reviewed and updated as appropriate: allergies, current medications, past family history, past medical history, past social history, past surgical history, and problem list.    Allergies:  Chocolate - food allergy, Other, Pollen extract, and Strawberry extract - food allergy    Medications:    Current Outpatient Medications:     escitalopram  "(LEXAPRO) 5 mg tablet, Take 1 tablet by mouth in the morning, Disp: , Rfl:     Prenatal Vit-Fe Fumarate-FA (PRENATAL VITAMIN PO), Take by mouth in the morning, Disp: , Rfl:     tamsulosin (FLOMAX) 0.4 mg, TAKE 1 CAPSULE BY MOUTH EVERY DAY AT NIGHT (Patient taking differently: 0.4 mg if needed), Disp: , Rfl:       Review of Systems:   Review of Systems   Constitutional: Negative.    Eyes: Negative.    Respiratory:  Negative for shortness of breath.    Cardiovascular:  Negative for chest pain.   Gastrointestinal:  Positive for nausea. Negative for abdominal pain and vomiting.   Genitourinary:  Negative for vaginal bleeding.          Objective:       Visit Vitals  /68   Ht 5' 2\" (1.575 m)   Wt 59.9 kg (132 lb)   LMP 12/07/2024 (Exact Date)   BMI 24.14 kg/m²   OB Status Pregnant   Smoking Status Never   BSA 1.6 m²        GEN: The patient was alert and oriented x3, pleasant well-appearing female in no acute distress.   CV: Regular rate  PULM: nonlabored respirations  MSK: Normal gait  : normal external female genitalia   Skin: warm, dry  Neuro: no focal deficits  Psych: normal affect and judgement, cooperative    Ultrasound:     Viability US     Gestational sac: present               Location: intrauterine  Yolk sac: present  Fetal pole: present               CRL: 1.61 cm = 8w0d  Cardiac activity: present               Rate: 160 bpm     Ovaries: normal appearing bilaterally  Cul de sac: absence of free fluid  Uterus: normal in appearance                       Ultrasound Probe Disinfection    A transvaginal ultrasound was performed.   Prior to use, disinfection was performed with High Level Disinfection Process (Trophon)  Probe serial number [id number]: [id number] was used    I have spent a total time of 30 minutes in caring for this patient on the day of the visit/encounter including Instructions for management, Patient and family education, Impressions, Counseling / Coordination of care, Documenting in the medical " record, and Obtaining or reviewing history  .      Haven Banda MD  02/03/25  2:14 PM

## 2025-02-04 ENCOUNTER — TELEPHONE (OUTPATIENT)
Age: 25
End: 2025-02-04

## 2025-02-19 NOTE — PATIENT INSTRUCTIONS
Congratulations!! Please review our Pregnancy Essential Guide and [facility] L&D Virtual tour from our networks website.     [facility] Pregnancy Essentials Guide  [facility] ([email or web address])     [facility] [place] [facility] ("[employer]

## 2025-02-20 ENCOUNTER — INITIAL PRENATAL (OUTPATIENT)
Dept: OBGYN CLINIC | Facility: CLINIC | Age: 25
End: 2025-02-20

## 2025-02-20 VITALS
SYSTOLIC BLOOD PRESSURE: 110 MMHG | BODY MASS INDEX: 24.84 KG/M2 | WEIGHT: 135 LBS | DIASTOLIC BLOOD PRESSURE: 78 MMHG | HEIGHT: 62 IN

## 2025-02-20 DIAGNOSIS — Z34.81 PRENATAL CARE, SUBSEQUENT PREGNANCY, FIRST TRIMESTER: Primary | ICD-10-CM

## 2025-02-20 DIAGNOSIS — E03.8 OTHER SPECIFIED HYPOTHYROIDISM: ICD-10-CM

## 2025-02-20 DIAGNOSIS — Z31.430 ENCOUNTER OF FEMALE FOR TESTING FOR GENETIC DISEASE CARRIER STATUS FOR PROCREATIVE MANAGEMENT: ICD-10-CM

## 2025-02-20 DIAGNOSIS — Z36.9 ENCOUNTER FOR ANTENATAL SCREENING: ICD-10-CM

## 2025-02-20 PROCEDURE — OBC

## 2025-02-20 NOTE — PROGRESS NOTES
OB INTAKE INTERVIEW  Patient is 24 y.o. who presents for OB intake at 10 5/7 wks  She is accompanied by S.O. during this encounter  The father of her baby Josh Zamora is involved in the pregnancy and is 28 years old.      Last Menstrual Period: 24  Ultrasound: Measured 8 weeks 0 days on 2/3/25  Estimated Date of Delivery: 25 confirmed by 8 week US    Signs/Symptoms of Pregnancy  Current pregnancy symptoms: Nausea, tired  Constipation YES-sometimes  Headaches YES-sometimes tylenol helps  Cramping/spotting no  PICA cravings no    Diabetes-  Body mass index is 24.69 kg/m².  If patient has 1 or more, please order early 1 hour GTT  History of GDM no  BMI >35 no  History of PCOS or current metformin use (should stop for 7 days prior to 1hr GTT unless pre-existing diabetes)  no  History of LGA/macrosomic infant (4000g/9lbs) no    If patient has 2 or more, please order early 1 hour GTT  BMI>30 no  AMA no  First degree relative with type 2 diabetes no  History of chronic HTN, hyperlipidemia, elevated A1C no  High risk race (, , ,  or ) no    Hypertension- if you answer yes to any of the following, please order baseline preeclampsia labs (cbc, comprehensive metabolic panel, urine protein creatinine ratio, uric acid)  History of of chronic HTN no  History of gestational HTN no  History of preeclampsia, eclampsia, or HELLP syndrome no  History of diabetes no  History of lupus,sjogrens syndrome, kidney disease no    Thyroid- if yes order TSH with reflex T4  History of thyroid disease YES-Hypothyroidism    Bleeding Disorder or Hx of DVT-patient or first degree relative with history of. Order the following if not done previously.   (Factor V, antithrombin III, prothrombin gene mutation, protein C and S Ag, lupus anticoagulant, anticardiolipin, beta-2 glycoprotein)   no    OB/GYN-  History of abnormal pap smear no       Date of last pap smear   History  of HPV no  History of Herpes/HSV no  History of other STI (gonorrhea, chlamydia, trich) no  History of prior  no  History of prior  no  History of  delivery prior to 36 weeks 6 days no  History of Varicella or Vaccination Vaccinated  History of blood transfusion no  Ok for blood transfusion YES    Substance screening-   History of tobacco use no  Currently using tobacco no  Substance Use Screen Level (N/A, LOW, HIGH) N/A    MRSA Screening-   Does the pt have a hx of MRSA? no    Immunizations:  Influenza vaccine given this season Not Interested  Discussed Tdap vaccine YES  Discussed COVID Vaccine YES    Genetic/MFM-  Do you or your partner have a history of any of the following in yourselves or first degree relatives?  Cystic fibrosis no  Spinal muscular atrophy no  Hemoglobinopathy/Sickle Cell/Thalassemia no  Fragile X Intellectual Disability no      If no, discuss Carrier Screening being completed once in a lifetime as a standard of care lab test. Place orders for Cystic Fibrosis Gene Test (HNL963) and Spinal Muscular Atrophy DNA ([id number])      Appointment for Nuchal Translucency Ultrasound at [facility] scheduled for Patient will call for Nuchal Translucency appt. ([facility] has been trying to contact Patient )      Interview education  [facility] Pregnancy Essentials Book reviewed, discussed and attached to their AVS YES    Nurse/Family Partnership- patient may qualify NO; referral placed NO    Prenatal lab work scripts YES  Extra labs ordered:  TSH labs    Aspirin/Preeclampsia Screen    Risk Level Risk Factor Recommendation   LOW Prior Uncomplicated full-term delivery no No Aspirin recommendation        MODERATE Nulliparity YES Recommend low-dose aspirin if     BMI>30 no 2 or more moderate risk factors    Family History Preeclampsia (mother/sister) no     35yr old or greater no     Black Race, Concern for SDOH/Low Socioeconomic no     IVF Pregnancy  no     Personal History Risks (low birth weight, prior  adverse preg outcome, >10yr preg interval) no         HIGH History of Preeclampsia no Recommend low-dose aspirin if     Multifetal gestation no 1 or more high risk factors    Chronic HTN no     Type 1 or 2 Diabetes no     Renal Disease no     Autoimmune Disease  no      Contraindications to ASA therapy:  NSAID/ ASA allergy: no  Nasal polyps: no  Asthma with history of ASA induced bronchospasm: no  Relative contraindications:  History of GI bleed: no  Active peptic ulcer disease: no  Severe hepatic dysfunction: no    Patient should be recommended to take ASA 162mg during this pregnancy from 12-36wks to lower her risk of preeclampsia: N/A          The patient has a history now or in prior pregnancy notable for:  Kidney stones with lithotripsy, Hypothyroidism, Depression-EPDS-10( #10-Never) (FOB-with heart murmur, arrhythmia and MS.)      Details that I feel the provider should be aware of: This is a planned and welcomed pregnancy for parents. Patient is experiencing some nausea. OTC medications and diet were discussed. Patient with a history of Hypothyroidism when she was younger and was on [place] thyroid medication. Provider advised to stop medication in 2023 due to normal Thyroid levels. TSH labs ordered. Patient is on Lexapro for depression. Counseling therapy was offered however Patient declined stating she is fine. Patient aware of Baby and me emotional support services as well. Patient with a history of kidney stones at one point she had a stent and then lithotripsy. Patient last passed a kidney stone in 2/23. No current stones and no stents. Patient takes Flomax only as needed. Patient shared she is doing research and is interested in a midwife because she wants to have a natural birth with no medications. Possibly a home birth. Patient was reminded to call [facility] for Nuchal translucency. [facility] has been trying to contact Patient.Patient said she will call. Patient knows to call OB for symptoms and how to contact her  nurse navigator. Patient was made aware to have lab orders completed before next appointment. Patient denies any questions at this point and verbalizes understanding.         PN1 visit scheduled. The patient was oriented to our practice, the navigator role, reviewed delivering physicians and [facility]. All questions were answered.    Interviewed by: Xuan Bains RN

## 2025-02-22 ENCOUNTER — APPOINTMENT (OUTPATIENT)
Dept: LAB | Facility: MEDICAL CENTER | Age: 25
End: 2025-02-22
Payer: COMMERCIAL

## 2025-02-22 DIAGNOSIS — E03.8 OTHER SPECIFIED HYPOTHYROIDISM: ICD-10-CM

## 2025-02-22 DIAGNOSIS — Z34.81 PRENATAL CARE, SUBSEQUENT PREGNANCY, FIRST TRIMESTER: ICD-10-CM

## 2025-02-22 LAB
ABO GROUP BLD: NORMAL
BACTERIA UR QL AUTO: ABNORMAL /HPF
BASOPHILS # BLD AUTO: 0.04 THOUSANDS/ΜL (ref 0–0.1)
BASOPHILS NFR BLD AUTO: 0 % (ref 0–1)
BILIRUB UR QL STRIP: NEGATIVE
BLD GP AB SCN SERPL QL: NEGATIVE
CLARITY UR: CLEAR
COLOR UR: YELLOW
EOSINOPHIL # BLD AUTO: 0.13 THOUSAND/ΜL (ref 0–0.61)
EOSINOPHIL NFR BLD AUTO: 1 % (ref 0–6)
ERYTHROCYTE [DISTWIDTH] IN BLOOD BY AUTOMATED COUNT: 13.5 % (ref 11.6–15.1)
GLUCOSE UR STRIP-MCNC: NEGATIVE MG/DL
HBV SURFACE AG SER QL: NORMAL
HCT VFR BLD AUTO: 39.6 % (ref 34.8–46.1)
HCV AB SER QL: NORMAL
HGB BLD-MCNC: 13.2 G/DL (ref 11.5–15.4)
HGB UR QL STRIP.AUTO: NEGATIVE
IMM GRANULOCYTES # BLD AUTO: 0.15 THOUSAND/UL (ref 0–0.2)
IMM GRANULOCYTES NFR BLD AUTO: 1 % (ref 0–2)
KETONES UR STRIP-MCNC: NEGATIVE MG/DL
LEUKOCYTE ESTERASE UR QL STRIP: ABNORMAL
LYMPHOCYTES # BLD AUTO: 2.18 THOUSANDS/ΜL (ref 0.6–4.47)
LYMPHOCYTES NFR BLD AUTO: 18 % (ref 14–44)
MCH RBC QN AUTO: 29 PG (ref 26.8–34.3)
MCHC RBC AUTO-ENTMCNC: 33.3 G/DL (ref 31.4–37.4)
MCV RBC AUTO: 87 FL (ref 82–98)
MONOCYTES # BLD AUTO: 0.99 THOUSAND/ΜL (ref 0.17–1.22)
MONOCYTES NFR BLD AUTO: 8 % (ref 4–12)
MUCOUS THREADS UR QL AUTO: ABNORMAL
NEUTROPHILS # BLD AUTO: 8.51 THOUSANDS/ΜL (ref 1.85–7.62)
NEUTS SEG NFR BLD AUTO: 72 % (ref 43–75)
NITRITE UR QL STRIP: NEGATIVE
NON-SQ EPI CELLS URNS QL MICRO: ABNORMAL /HPF
NRBC BLD AUTO-RTO: 0 /100 WBCS
PH UR STRIP.AUTO: 6.5 [PH]
PLATELET # BLD AUTO: 261 THOUSANDS/UL (ref 149–390)
PMV BLD AUTO: 10.3 FL (ref 8.9–12.7)
PROT UR STRIP-MCNC: ABNORMAL MG/DL
RBC # BLD AUTO: 4.55 MILLION/UL (ref 3.81–5.12)
RBC #/AREA URNS AUTO: ABNORMAL /HPF
RH BLD: POSITIVE
RUBV IGG SERPL IA-ACNC: 104.6 IU/ML
SP GR UR STRIP.AUTO: 1.02 (ref 1–1.03)
TSH SERPL DL<=0.05 MIU/L-ACNC: 1.94 UIU/ML (ref 0.45–4.5)
UROBILINOGEN UR STRIP-ACNC: <2 MG/DL
WBC # BLD AUTO: 12 THOUSAND/UL (ref 4.31–10.16)
WBC #/AREA URNS AUTO: ABNORMAL /HPF

## 2025-02-22 PROCEDURE — 86780 TREPONEMA PALLIDUM: CPT

## 2025-02-22 PROCEDURE — 85025 COMPLETE CBC W/AUTO DIFF WBC: CPT

## 2025-02-22 PROCEDURE — 84443 ASSAY THYROID STIM HORMONE: CPT

## 2025-02-22 PROCEDURE — 81001 URINALYSIS AUTO W/SCOPE: CPT

## 2025-02-22 PROCEDURE — 87389 HIV-1 AG W/HIV-1&-2 AB AG IA: CPT

## 2025-02-22 PROCEDURE — 87086 URINE CULTURE/COLONY COUNT: CPT

## 2025-02-22 PROCEDURE — 86850 RBC ANTIBODY SCREEN: CPT

## 2025-02-22 PROCEDURE — 87340 HEPATITIS B SURFACE AG IA: CPT

## 2025-02-22 PROCEDURE — 36415 COLL VENOUS BLD VENIPUNCTURE: CPT

## 2025-02-22 PROCEDURE — 86900 BLOOD TYPING SEROLOGIC ABO: CPT

## 2025-02-22 PROCEDURE — 86762 RUBELLA ANTIBODY: CPT

## 2025-02-22 PROCEDURE — 86803 HEPATITIS C AB TEST: CPT

## 2025-02-22 PROCEDURE — 86901 BLOOD TYPING SEROLOGIC RH(D): CPT

## 2025-02-23 LAB
BACTERIA UR CULT: ABNORMAL
HIV 1+2 AB+HIV1 P24 AG SERPL QL IA: NORMAL
TREPONEMA PALLIDUM IGG+IGM AB [PRESENCE] IN SERUM OR PLASMA BY IMMUNOASSAY: NORMAL

## 2025-02-24 ENCOUNTER — TELEPHONE (OUTPATIENT)
Age: 25
End: 2025-02-24

## 2025-02-24 ENCOUNTER — RESULTS FOLLOW-UP (OUTPATIENT)
Dept: OBGYN CLINIC | Facility: CLINIC | Age: 25
End: 2025-02-24

## 2025-02-24 NOTE — RESULT ENCOUNTER NOTE
Normal prenatal labs  Your urine has a small amount of bacteria in your urine.  This is not an infection

## 2025-02-24 NOTE — TELEPHONE ENCOUNTER
Pt called in wanting to discuss her results further. States she was notified her urine culture has bacteria but no infection, unsure what that means. Advised the culture could have picked up the normal bacteria found in her discharge/vaginal area but is not concerning for a UTI. Pt also denies any urinary symptoms at this time.     Pt also states she was called and told about genetic testing, wondering if she should be concerned that something is wrong. Advised that there is routine testing done through the [facility] and genetic blood work. It is recommended to all patients, no concerns for patient at this time. She verbalized understanding and is thankful.

## 2025-02-24 NOTE — TELEPHONE ENCOUNTER
Patient called to schedule her first MFM US but is declining the Nuchal. Patient stated that she discussed to have an early US. What kind of US would that be? Please follow up with the patient

## 2025-02-25 ENCOUNTER — TELEPHONE (OUTPATIENT)
Age: 25
End: 2025-02-25

## 2025-02-25 ENCOUNTER — TELEPHONE (OUTPATIENT)
Facility: HOSPITAL | Age: 25
End: 2025-02-25

## 2025-02-25 NOTE — TELEPHONE ENCOUNTER
I tried to schedule a genetic counseling class with her detailed appt for the patient but I did not see anything available. Please follow up with the patient. Detailed is scheduled.

## 2025-03-04 ENCOUNTER — INITIAL PRENATAL (OUTPATIENT)
Dept: OBGYN CLINIC | Facility: CLINIC | Age: 25
End: 2025-03-04

## 2025-03-04 VITALS
HEIGHT: 62 IN | BODY MASS INDEX: 25.58 KG/M2 | SYSTOLIC BLOOD PRESSURE: 108 MMHG | WEIGHT: 139 LBS | DIASTOLIC BLOOD PRESSURE: 78 MMHG

## 2025-03-04 DIAGNOSIS — Z34.01 NORMAL FIRST PREGNANCY CONFIRMED, CURRENTLY IN FIRST TRIMESTER: Primary | ICD-10-CM

## 2025-03-04 DIAGNOSIS — Z3A.12 12 WEEKS GESTATION OF PREGNANCY: ICD-10-CM

## 2025-03-04 PROCEDURE — PNV: Performed by: PHYSICIAN ASSISTANT

## 2025-03-04 PROCEDURE — 87491 CHLMYD TRACH DNA AMP PROBE: CPT | Performed by: PHYSICIAN ASSISTANT

## 2025-03-04 PROCEDURE — 87591 N.GONORRHOEAE DNA AMP PROB: CPT | Performed by: PHYSICIAN ASSISTANT

## 2025-03-04 NOTE — PROGRESS NOTES
Patient is a 23 YO  female presenting to the office at 12.3 weeks for routine OB care.   BP: 108/78  TWlb  Fetal Movement: none yet    24 y.o.  female at 12w3d (Estimated Date of Delivery: 25) for PNV.    Pre-Elsy Vitals      Flowsheet Row Most Recent Value   Prenatal Assessment    Prenatal Vitals    Blood Pressure 108/78   Weight - Scale 63 kg (139 lb)   Urine Albumin/Glucose    Dilation/Effacement/Station    Vaginal Drainage    Edema           TW.082 kg (9 lb)    Cramping: no  Bleeding: no  LOF: no  NT/13 week scan scheduled: patient declines NT scan and genetic testing  Anatomy scan scheduled   AFP ordered if indicated: no  Prenatal labs complete (including Heb B, HIV): yes; date completed   Pap collected: no  GC collected:yes  Oriented to practice/delivery location.   Patient is accompanied today by her mother - we discussed that patient is interested in a home birth or potential midwife delivery. I reviewed with patient and her mother the significant risk of maternal and  mortality and morbidity related to a home birth. Patient states she wants to just let her body do its thing, as poor outcomes can also happen in a hospital. We reviewed all the resources that a hospital or even birthing center have to offer as compared with a home birth in which life saving interventions or measures are not readily available. I encouraged patient that if she strongly desires a midwife delivery and is interested in the hospital setting, [facility] is equipped with in-hospital midwives as well as tubs to labor in as patient would like to labor in a tub and we do not offer this service. I did review with patient concerns regarding postterm pregnancy should spontaneous labor not occur including increased risk of stillbirth, fetal intolerance of labor and placental insufficiency. I reviewed with her that as a practice, we do not support or recommend home births as our utmost goal is patient safety  through healthy mom and healthy baby. She expresses understanding and will consider her options.   RTO 4 weeks

## 2025-03-05 LAB
C TRACH DNA SPEC QL NAA+PROBE: NEGATIVE
N GONORRHOEA DNA SPEC QL NAA+PROBE: NEGATIVE

## 2025-03-31 ENCOUNTER — ROUTINE PRENATAL (OUTPATIENT)
Dept: OBGYN CLINIC | Facility: CLINIC | Age: 25
End: 2025-03-31

## 2025-03-31 VITALS — WEIGHT: 136.8 LBS | DIASTOLIC BLOOD PRESSURE: 72 MMHG | BODY MASS INDEX: 25.02 KG/M2 | SYSTOLIC BLOOD PRESSURE: 108 MMHG

## 2025-03-31 DIAGNOSIS — Z3A.17 17 WEEKS GESTATION OF PREGNANCY: ICD-10-CM

## 2025-03-31 DIAGNOSIS — Z34.02 SUPERVISION OF NORMAL FIRST PREGNANCY IN SECOND TRIMESTER: Primary | ICD-10-CM

## 2025-03-31 PROCEDURE — PNV: Performed by: PHYSICIAN ASSISTANT

## 2025-04-08 NOTE — PROGRESS NOTES
24 y.o.  female at 17w3d (Estimated Date of Delivery: 25) for PNV.    Pre-Elsy Vitals      Flowsheet Row Most Recent Value   Prenatal Assessment    Movement Absent   Prenatal Vitals    Blood Pressure 108/72   Weight - Scale 62.1 kg (136 lb 12.8 oz)   Urine Albumin/Glucose    Dilation/Effacement/Station    Vaginal Drainage    Edema           TWG: 3.084 kg (6 lb 12.8 oz)    Leakage of fluid: no  Vaginal bleeding: no  Contractions/Cramping: no  Fetal movement: yes  Pt is feeling well  Declines NIPT/afp  Has appt for 20 week/level 2 us    RTO in 4 weeks.

## 2025-04-10 ENCOUNTER — TELEPHONE (OUTPATIENT)
Dept: OBGYN CLINIC | Facility: CLINIC | Age: 25
End: 2025-04-10

## 2025-04-10 NOTE — TELEPHONE ENCOUNTER
Patient was called for 2nd trimester follow up.  Left a voicemail.  Patient was reminded that she can message this nurse via portal and If  having any symptoms or concerns to Please call [phone] The nurses are available  24/7 and will determine if she needs to be evaluated for her concerns or symptoms.      Xuan RN  OB Nurse Navigator

## 2025-04-17 ENCOUNTER — TELEPHONE (OUTPATIENT)
Dept: OBGYN CLINIC | Facility: CLINIC | Age: 25
End: 2025-04-17

## 2025-04-17 NOTE — TELEPHONE ENCOUNTER
Patient was called for 2nd  trimester follow up.  Left a voicemail.  Patient was reminded that she can message this nurse via My Chart and If  having any symptoms or concerns to Please call [phone]     JAVAN Govea  OB Nurse Navigator

## 2025-04-28 ENCOUNTER — ROUTINE PRENATAL (OUTPATIENT)
Facility: HOSPITAL | Age: 25
End: 2025-04-28
Attending: OBSTETRICS & GYNECOLOGY
Payer: COMMERCIAL

## 2025-04-28 VITALS
WEIGHT: 145.2 LBS | HEART RATE: 76 BPM | BODY MASS INDEX: 26.72 KG/M2 | SYSTOLIC BLOOD PRESSURE: 122 MMHG | DIASTOLIC BLOOD PRESSURE: 72 MMHG | HEIGHT: 62 IN

## 2025-04-28 DIAGNOSIS — Z34.01 NORMAL FIRST PREGNANCY CONFIRMED, CURRENTLY IN FIRST TRIMESTER: ICD-10-CM

## 2025-04-28 DIAGNOSIS — N91.2 AMENORRHEA: ICD-10-CM

## 2025-04-28 DIAGNOSIS — Z3A.20 20 WEEKS GESTATION OF PREGNANCY: Primary | ICD-10-CM

## 2025-04-28 DIAGNOSIS — Z36.86 ENCOUNTER FOR ANTENATAL SCREENING FOR CERVICAL LENGTH: ICD-10-CM

## 2025-04-28 DIAGNOSIS — Z36.3 ENCOUNTER FOR ANTENATAL SCREENING FOR MALFORMATION USING ULTRASOUND: ICD-10-CM

## 2025-04-28 PROCEDURE — 76805 OB US >/= 14 WKS SNGL FETUS: CPT | Performed by: OBSTETRICS & GYNECOLOGY

## 2025-04-28 PROCEDURE — 99202 OFFICE O/P NEW SF 15 MIN: CPT | Performed by: OBSTETRICS & GYNECOLOGY

## 2025-04-28 PROCEDURE — 76817 TRANSVAGINAL US OBSTETRIC: CPT | Performed by: OBSTETRICS & GYNECOLOGY

## 2025-04-28 NOTE — PROGRESS NOTES
"On exam today, the patient appears well, in no acute distress, and denies any complaints.  For which she takes Lexapro, she has no other significant contributory history.  She has a remote history of acquired hypothyroidism not currently an active issue with a recently normal TSH level.    The patient has declined genetic screening, and we discussed that a normal ultrasound does not exclude all congenital birth defects or karyotypic abnormalities.    The fetal anatomic survey is complete. There is no sonographic evidence of fetal abnormalities at this time. Good fetal movement and tone are seen. The amniotic fluid volume appears normal. A transvaginal ultrasound was performed to assess the cervix, which was not seen well transabdominally. The cervical length was 9 to centimeters, which is normal for the current gestational age. There was no significant funneling or dynamic changes appreciated.     The patient was informed of today's findings and all of her questions were answered to apparent satisfaction. The limitations of ultrasound were reviewed.    We discussed follow-up in detail and I recommend the patient return as clinically indicated.    Thank you very much for allowing us to participate in the care of this very nice patient. Should you have any questions, please do not hesitate to contact our office.    Please note I spent 5 minutes reviewing records prior to the visit, 5 minutes in patient contact including counseling and coordination of care, and 5 minutes in charting in the electronic medical record. Total time spent for the encounter is 15 minutes.    Portions of the record may have been created with voice recognition software. Occasional wrong word or \"sound a like\" substitutions may have occurred due to the inherent limitations of voice recognition software. Read the chart carefully and recognize, using context, where substitutions have occurred.  "

## 2025-04-28 NOTE — PROGRESS NOTES
Ultrasound Probe Disinfection    A transvaginal ultrasound was performed.   Prior to use, disinfection was performed with High Level Disinfection Process ([facility] [employer]).  Probe serial number [id number] [facility] [id number] was used.    Regi Espinoza  04/28/25  10:43 AM

## 2025-07-11 ENCOUNTER — TELEPHONE (OUTPATIENT)
Dept: OBGYN CLINIC | Facility: CLINIC | Age: 25
End: 2025-07-11

## 2025-07-11 NOTE — TELEPHONE ENCOUNTER
Patient was called for 3rd  trimester follow up.  Left a voicemail.  Patient was reminded that she can message this nurse via My Chart and If  having any symptoms or concerns to Please call [phone].     JAVAN Govea  OB Nurse Navigator

## 2025-07-18 ENCOUNTER — TELEPHONE (OUTPATIENT)
Dept: OBGYN CLINIC | Facility: CLINIC | Age: 25
End: 2025-07-18

## 2025-07-18 NOTE — TELEPHONE ENCOUNTER
Patient was called for 3rd Trimester Follow up. Patient is doing well and stated she transferred her care to [facility].    Xuan Bains RN  OB Nurse Navigator